# Patient Record
Sex: MALE | Race: WHITE | NOT HISPANIC OR LATINO
[De-identification: names, ages, dates, MRNs, and addresses within clinical notes are randomized per-mention and may not be internally consistent; named-entity substitution may affect disease eponyms.]

---

## 2018-11-15 PROBLEM — Z00.00 ENCOUNTER FOR PREVENTIVE HEALTH EXAMINATION: Status: ACTIVE | Noted: 2018-11-15

## 2019-01-23 ENCOUNTER — RECORD ABSTRACTING (OUTPATIENT)
Age: 74
End: 2019-01-23

## 2019-01-23 DIAGNOSIS — Z87.39 PERSONAL HISTORY OF OTHER DISEASES OF THE MUSCULOSKELETAL SYSTEM AND CONNECTIVE TISSUE: ICD-10-CM

## 2019-01-23 DIAGNOSIS — Z86.39 PERSONAL HISTORY OF OTHER ENDOCRINE, NUTRITIONAL AND METABOLIC DISEASE: ICD-10-CM

## 2019-01-23 DIAGNOSIS — Z86.79 PERSONAL HISTORY OF OTHER DISEASES OF THE CIRCULATORY SYSTEM: ICD-10-CM

## 2019-01-23 DIAGNOSIS — Z86.19 PERSONAL HISTORY OF OTHER INFECTIOUS AND PARASITIC DISEASES: ICD-10-CM

## 2019-01-23 RX ORDER — SIMVASTATIN 40 MG/1
40 TABLET, FILM COATED ORAL
Refills: 0 | Status: ACTIVE | COMMUNITY

## 2019-01-23 RX ORDER — PERPHENAZINE/AMITRIPTYLINE HCL 2 MG-25 MG
3000 TABLET ORAL
Refills: 0 | Status: ACTIVE | COMMUNITY

## 2019-01-23 RX ORDER — OMEPRAZOLE 20 MG/1
20 CAPSULE, DELAYED RELEASE ORAL
Refills: 0 | Status: ACTIVE | COMMUNITY

## 2019-01-23 RX ORDER — HYDROCHLOROTHIAZIDE 25 MG/1
25 TABLET ORAL
Refills: 0 | Status: ACTIVE | COMMUNITY

## 2019-01-23 RX ORDER — METOPROLOL TARTRATE 50 MG/1
50 TABLET, FILM COATED ORAL
Refills: 0 | Status: ACTIVE | COMMUNITY

## 2019-01-23 RX ORDER — IRBESARTAN 300 MG/1
300 TABLET ORAL
Refills: 0 | Status: ACTIVE | COMMUNITY

## 2019-01-23 RX ORDER — OMEGA-3/DHA/EPA/FISH OIL 1200 MG
1200 CAPSULE ORAL
Refills: 0 | Status: ACTIVE | COMMUNITY

## 2019-01-28 ENCOUNTER — APPOINTMENT (OUTPATIENT)
Dept: RHEUMATOLOGY | Facility: CLINIC | Age: 74
End: 2019-01-28
Payer: MEDICARE

## 2019-01-28 VITALS
BODY MASS INDEX: 31.56 KG/M2 | DIASTOLIC BLOOD PRESSURE: 70 MMHG | WEIGHT: 233 LBS | SYSTOLIC BLOOD PRESSURE: 120 MMHG | HEIGHT: 72 IN

## 2019-01-28 DIAGNOSIS — Z78.9 OTHER SPECIFIED HEALTH STATUS: ICD-10-CM

## 2019-01-28 DIAGNOSIS — Z80.1 FAMILY HISTORY OF MALIGNANT NEOPLASM OF TRACHEA, BRONCHUS AND LUNG: ICD-10-CM

## 2019-01-28 DIAGNOSIS — Z87.891 PERSONAL HISTORY OF NICOTINE DEPENDENCE: ICD-10-CM

## 2019-01-28 PROCEDURE — 99213 OFFICE O/P EST LOW 20 MIN: CPT

## 2019-01-30 PROBLEM — Z78.9 SOCIAL ALCOHOL USE: Status: ACTIVE | Noted: 2019-01-30

## 2019-01-30 PROBLEM — Z80.1 FAMILY HISTORY OF LUNG CANCER: Status: ACTIVE | Noted: 2019-01-30

## 2019-01-30 PROBLEM — Z87.891 FORMER SMOKER: Status: ACTIVE | Noted: 2019-01-30

## 2019-01-30 NOTE — HISTORY OF PRESENT ILLNESS
[FreeTextEntry1] :  72 yo male with RA here for f/u. He is currently taking MTX 6 tabs weekly + folic acid, Remicade infusions every 8 weeks, Plaquenil 200 mg BID and Celebrex 200 mg BID. Last infusion was 1/14/19.\par He has increased pain in his hands and left ankle with the cold weather.  He uses Tylenol Arthritis PRN, which helps, but he uses it rarely, mostly when he can't sleep.  No skin rashes or psoriasis.  His hands are very stiff lately.

## 2019-01-31 ENCOUNTER — RX RENEWAL (OUTPATIENT)
Age: 74
End: 2019-01-31

## 2019-05-01 ENCOUNTER — RX RENEWAL (OUTPATIENT)
Age: 74
End: 2019-05-01

## 2019-06-24 ENCOUNTER — APPOINTMENT (OUTPATIENT)
Dept: RHEUMATOLOGY | Facility: CLINIC | Age: 74
End: 2019-06-24
Payer: MEDICARE

## 2019-06-24 VITALS
WEIGHT: 232 LBS | HEIGHT: 72 IN | BODY MASS INDEX: 31.42 KG/M2 | SYSTOLIC BLOOD PRESSURE: 118 MMHG | DIASTOLIC BLOOD PRESSURE: 72 MMHG

## 2019-06-24 PROCEDURE — 99213 OFFICE O/P EST LOW 20 MIN: CPT

## 2019-06-24 RX ORDER — AZITHROMYCIN 250 MG/1
250 TABLET, FILM COATED ORAL
Qty: 1 | Refills: 0 | Status: DISCONTINUED | COMMUNITY
Start: 2019-01-28 | End: 2019-06-24

## 2019-06-24 NOTE — HISTORY OF PRESENT ILLNESS
[FreeTextEntry1] : 74 yo male with RA here for f/u. He is currently taking MTX 6 tabs weekly + folic acid, Remicade infusions every 8 weeks, Plaquenil 200 mg BID and Celebrex 200 mg BID. Last infusion was 5/9/19.\par He had pain in his right big toe.  Saw ortho and had an xray but declines bunion surgery.  He gets pain in his hands and wrists with the rainy weather.  Last week he had left wrist pain, burning in quality, but it has since resolved.\par His PMD referred him to a urologist bc he had blood in his urine.\par He takes Tylenol Arthritis when he is really suffering.\par \par

## 2019-07-08 ENCOUNTER — RESULT REVIEW (OUTPATIENT)
Age: 74
End: 2019-07-08

## 2019-08-20 ENCOUNTER — APPOINTMENT (OUTPATIENT)
Dept: RHEUMATOLOGY | Facility: CLINIC | Age: 74
End: 2019-08-20
Payer: MEDICARE

## 2019-08-20 VITALS
DIASTOLIC BLOOD PRESSURE: 74 MMHG | HEIGHT: 74 IN | WEIGHT: 232 LBS | SYSTOLIC BLOOD PRESSURE: 110 MMHG | BODY MASS INDEX: 29.77 KG/M2

## 2019-08-20 PROCEDURE — 99213 OFFICE O/P EST LOW 20 MIN: CPT

## 2019-08-20 NOTE — HISTORY OF PRESENT ILLNESS
[FreeTextEntry1] : 72 yo male with RA here for f/u. He is currently taking MTX 6 tabs weekly + folic acid, Remicade infusions every 8 weeks, Plaquenil 200 mg BID and Celebrex 200 mg BID. Last infusion was 7/8/19.  He c/o pain at the base of his thumbs, sometimes radiating into his forearms.  + stiffness in his joints that lasts all day\par \par

## 2019-09-03 ENCOUNTER — RESULT REVIEW (OUTPATIENT)
Age: 74
End: 2019-09-03

## 2019-10-29 ENCOUNTER — RESULT REVIEW (OUTPATIENT)
Age: 74
End: 2019-10-29

## 2019-11-11 ENCOUNTER — APPOINTMENT (OUTPATIENT)
Dept: RHEUMATOLOGY | Facility: CLINIC | Age: 74
End: 2019-11-11
Payer: MEDICARE

## 2019-11-11 VITALS
HEIGHT: 74 IN | BODY MASS INDEX: 29.77 KG/M2 | SYSTOLIC BLOOD PRESSURE: 124 MMHG | WEIGHT: 232 LBS | DIASTOLIC BLOOD PRESSURE: 80 MMHG

## 2019-11-11 PROCEDURE — 99213 OFFICE O/P EST LOW 20 MIN: CPT

## 2019-11-11 NOTE — HISTORY OF PRESENT ILLNESS
[FreeTextEntry1] : 75 yo male with RA here for f/u. He is currently taking MTX 6 tabs weekly + folic acid, Remicade infusions every 8 weeks, Plaquenil 200 mg BID and Celebrex 200 mg BID. Last infusion was 10/29/19.  \par He saw his cardiologist, who wants a stress test next year.\par He c/o pain at the base of his thumbs, sometimes radiating into his forearms.  \par + stiffness in his joints that lasts all day\par Seeing a urologist for a small amount of blood in his urine.

## 2019-12-27 ENCOUNTER — RESULT REVIEW (OUTPATIENT)
Age: 74
End: 2019-12-27

## 2020-03-03 ENCOUNTER — RESULT REVIEW (OUTPATIENT)
Age: 75
End: 2020-03-03

## 2020-04-08 ENCOUNTER — APPOINTMENT (OUTPATIENT)
Dept: RHEUMATOLOGY | Facility: CLINIC | Age: 75
End: 2020-04-08
Payer: MEDICARE

## 2020-04-08 PROCEDURE — 99442: CPT

## 2020-05-18 ENCOUNTER — APPOINTMENT (OUTPATIENT)
Dept: RHEUMATOLOGY | Facility: CLINIC | Age: 75
End: 2020-05-18
Payer: MEDICARE

## 2020-05-18 PROCEDURE — 99213 OFFICE O/P EST LOW 20 MIN: CPT | Mod: 95

## 2020-05-18 NOTE — HISTORY OF PRESENT ILLNESS
[Home] : at home, [unfilled] , at the time of the visit. [Medical Office: ([facility])___] : at the medical office located in  [Patient] : the patient [Self] : self [FreeTextEntry2] : David [FreeTextEntry1] : He is currently taking MTX 6 tabs weekly + folic acid, Remicade infusions every 8 weeks, Plaquenil 200 mg BID and Celebrex 200 mg BID. Last infusion was 3/3/20.  He is 2 weeks late for his infusion.\par + stiffness in his joints that lasts all day\par

## 2020-05-18 NOTE — REASON FOR VISIT
[Home] : at home, [unfilled] , at the time of the visit. [Medical Office: ([facility])___] : at the medical office located in  [Self] : self [Patient] : the patient [FreeTextEntry2] : David [Follow-Up: _____] : a [unfilled] follow-up visit

## 2020-06-01 ENCOUNTER — RESULT REVIEW (OUTPATIENT)
Age: 75
End: 2020-06-01

## 2020-06-08 ENCOUNTER — APPOINTMENT (OUTPATIENT)
Dept: RHEUMATOLOGY | Facility: CLINIC | Age: 75
End: 2020-06-08
Payer: MEDICARE

## 2020-06-08 DIAGNOSIS — D64.9 ANEMIA, UNSPECIFIED: ICD-10-CM

## 2020-06-08 PROCEDURE — 99442: CPT | Mod: 95

## 2020-06-08 NOTE — HISTORY OF PRESENT ILLNESS
[Home] : at home, [unfilled] , at the time of the visit. [Other Location: e.g. Home (Enter Location, City,State)___] : at [unfilled] [Verbal consent obtained from patient] : the patient, [unfilled] [FreeTextEntry1] : He is currently taking MTX 6 tabs weekly + folic acid, Remicade infusions every 8 weeks, Plaquenil 200 mg BID and Celebrex 200 mg BID. Last infusion was 6/1/20.  \par He was a few weeks late for the infusion and noticed increased achiness and his skin started to flare.  Once he received the infusion, he felt much better.

## 2020-07-24 ENCOUNTER — RESULT REVIEW (OUTPATIENT)
Age: 75
End: 2020-07-24

## 2020-07-27 ENCOUNTER — RESULT REVIEW (OUTPATIENT)
Age: 75
End: 2020-07-27

## 2020-08-10 ENCOUNTER — APPOINTMENT (OUTPATIENT)
Dept: RHEUMATOLOGY | Facility: CLINIC | Age: 75
End: 2020-08-10
Payer: MEDICARE

## 2020-08-10 VITALS
BODY MASS INDEX: 29.77 KG/M2 | DIASTOLIC BLOOD PRESSURE: 60 MMHG | SYSTOLIC BLOOD PRESSURE: 110 MMHG | HEIGHT: 74 IN | WEIGHT: 232 LBS

## 2020-08-10 PROCEDURE — 99214 OFFICE O/P EST MOD 30 MIN: CPT

## 2020-08-13 NOTE — HISTORY OF PRESENT ILLNESS
[FreeTextEntry1] : He is currently taking MTX 6 tabs weekly + folic acid, Remicade infusions every 8 weeks, Plaquenil 200 mg BID and Celebrex 200 mg BID. Last infusion was 7/27/20.  \par he has achiness and stiffness in his hands, no more than usual.  He never started using the Voltaren Gel.  His ankle is constantly in pain due to previous fracture but the oonly way to settle that is with surgical fusion.

## 2020-09-21 ENCOUNTER — RESULT REVIEW (OUTPATIENT)
Age: 75
End: 2020-09-21

## 2020-10-05 ENCOUNTER — APPOINTMENT (OUTPATIENT)
Dept: RHEUMATOLOGY | Facility: CLINIC | Age: 75
End: 2020-10-05
Payer: MEDICARE

## 2020-10-05 PROCEDURE — 99213 OFFICE O/P EST LOW 20 MIN: CPT

## 2020-10-05 NOTE — HISTORY OF PRESENT ILLNESS
[FreeTextEntry1] : He is currently taking MTX 6 tabs weekly + folic acid, Remicade infusions every 8 weeks, Plaquenil 200 mg BID and Celebrex 200 mg BID. Last infusion was 9/21/20.\par \par He overdid it yesterday and so is sore.\par He applies Voltaren gel about twice a day.

## 2020-11-16 ENCOUNTER — RESULT REVIEW (OUTPATIENT)
Age: 75
End: 2020-11-16

## 2020-12-07 ENCOUNTER — APPOINTMENT (OUTPATIENT)
Dept: RHEUMATOLOGY | Facility: CLINIC | Age: 75
End: 2020-12-07
Payer: MEDICARE

## 2020-12-07 VITALS
HEIGHT: 74 IN | WEIGHT: 232 LBS | SYSTOLIC BLOOD PRESSURE: 130 MMHG | BODY MASS INDEX: 29.77 KG/M2 | DIASTOLIC BLOOD PRESSURE: 80 MMHG

## 2020-12-07 PROCEDURE — 99213 OFFICE O/P EST LOW 20 MIN: CPT

## 2020-12-07 NOTE — HISTORY OF PRESENT ILLNESS
[FreeTextEntry1] : He is currently taking MTX 6 tabs weekly + folic acid, Remicade infusions every 8 weeks, Plaquenil 200 mg BID and Celebrex 200 mg BID. Last infusion was 11/16/20.\par He gets pain at the base of his right thumb.  He only really notices it if he doesn't apply the Voltaren gel.

## 2021-01-08 ENCOUNTER — RESULT REVIEW (OUTPATIENT)
Age: 76
End: 2021-01-08

## 2021-01-11 ENCOUNTER — RESULT REVIEW (OUTPATIENT)
Age: 76
End: 2021-01-11

## 2021-02-01 ENCOUNTER — APPOINTMENT (OUTPATIENT)
Dept: RHEUMATOLOGY | Facility: CLINIC | Age: 76
End: 2021-02-01
Payer: MEDICARE

## 2021-02-01 PROCEDURE — 99213 OFFICE O/P EST LOW 20 MIN: CPT | Mod: 95

## 2021-02-01 NOTE — HISTORY OF PRESENT ILLNESS
[Home] : at home, [unfilled] , at the time of the visit. [Other Location: e.g. Home (Enter Location, City,State)___] : at [unfilled] [Verbal consent obtained from patient] : the patient, [unfilled] [FreeTextEntry1] : He is currently taking MTX 6 tabs weekly + folic acid, Remicade infusions every 8 weeks, Plaquenil 200 mg BID and Celebrex 200 mg BID. Last infusion was 1/12/21.\par When he wakes up in the morning he feels congested and it resolves throughout the day.  Every once in awhile he checks his temperature, and it is normal.  He has a history of allergies but doesn't take anti-histamines.\par He gets pain at the base of his right thumb.  He only really notices it if he doesn't apply the Voltaren gel.

## 2021-03-05 ENCOUNTER — RESULT REVIEW (OUTPATIENT)
Age: 76
End: 2021-03-05

## 2021-03-08 ENCOUNTER — RESULT REVIEW (OUTPATIENT)
Age: 76
End: 2021-03-08

## 2021-03-25 ENCOUNTER — APPOINTMENT (OUTPATIENT)
Dept: RHEUMATOLOGY | Facility: CLINIC | Age: 76
End: 2021-03-25
Payer: MEDICARE

## 2021-03-25 PROCEDURE — 99213 OFFICE O/P EST LOW 20 MIN: CPT | Mod: 95

## 2021-03-28 NOTE — HISTORY OF PRESENT ILLNESS
[Home] : at home, [unfilled] , at the time of the visit. [Medical Office: ([facility])___] : at the medical office located in  [Verbal consent obtained from patient] : the patient, [unfilled] [FreeTextEntry1] : He is currently taking MTX 6 tabs weekly + folic acid, Remicade infusions every 8 weeks, Plaquenil 200 mg BID and Celebrex 200 mg BID. Last infusion was 3/8/21.\par \par He received both Moderna vaccines.\par \par His calf swelled up and his foot became discolored.  His PMD ordered an US which didn’t show any DVT but a Baker's cyst.  It is improving slowly.  It still feels tight.  The discoloration on his foot went away.

## 2021-04-30 ENCOUNTER — RESULT REVIEW (OUTPATIENT)
Age: 76
End: 2021-04-30

## 2021-05-03 ENCOUNTER — RESULT REVIEW (OUTPATIENT)
Age: 76
End: 2021-05-03

## 2021-05-13 ENCOUNTER — APPOINTMENT (OUTPATIENT)
Dept: RHEUMATOLOGY | Facility: CLINIC | Age: 76
End: 2021-05-13
Payer: MEDICARE

## 2021-05-13 ENCOUNTER — NON-APPOINTMENT (OUTPATIENT)
Age: 76
End: 2021-05-13

## 2021-05-13 VITALS
TEMPERATURE: 98.9 F | BODY MASS INDEX: 28.75 KG/M2 | SYSTOLIC BLOOD PRESSURE: 118 MMHG | WEIGHT: 224 LBS | HEIGHT: 74 IN | DIASTOLIC BLOOD PRESSURE: 68 MMHG

## 2021-05-13 PROCEDURE — 99213 OFFICE O/P EST LOW 20 MIN: CPT

## 2021-05-18 NOTE — HISTORY OF PRESENT ILLNESS
[FreeTextEntry1] : He is currently taking MTX 6 tabs weekly + folic acid, Remicade infusions every 8 weeks, Plaquenil 200 mg BID and Celebrex 200 mg BID. Last infusion was 5/3/21.\par \par He received both Moderna vaccines.\par \par He had 2 teeth pulled.\par \par Chavis's cyst improving.

## 2021-07-16 ENCOUNTER — RESULT REVIEW (OUTPATIENT)
Age: 76
End: 2021-07-16

## 2021-07-26 ENCOUNTER — APPOINTMENT (OUTPATIENT)
Dept: RHEUMATOLOGY | Facility: CLINIC | Age: 76
End: 2021-07-26
Payer: MEDICARE

## 2021-07-26 VITALS
RESPIRATION RATE: 16 BRPM | TEMPERATURE: 98 F | OXYGEN SATURATION: 98 % | DIASTOLIC BLOOD PRESSURE: 70 MMHG | HEART RATE: 65 BPM | SYSTOLIC BLOOD PRESSURE: 128 MMHG | BODY MASS INDEX: 29.39 KG/M2 | HEIGHT: 74 IN | WEIGHT: 229 LBS

## 2021-07-26 PROCEDURE — 99213 OFFICE O/P EST LOW 20 MIN: CPT

## 2021-07-28 NOTE — HISTORY OF PRESENT ILLNESS
[FreeTextEntry1] : He is currently taking MTX 6 tabs weekly + folic acid, Remicade infusions every 8 weeks, Plaquenil 200 mg BID and Celebrex 200 mg BID. Last infusion was 7/16/21.\par \par

## 2021-09-13 ENCOUNTER — RESULT REVIEW (OUTPATIENT)
Age: 76
End: 2021-09-13

## 2021-11-02 ENCOUNTER — APPOINTMENT (OUTPATIENT)
Dept: RHEUMATOLOGY | Facility: CLINIC | Age: 76
End: 2021-11-02
Payer: MEDICARE

## 2021-11-02 PROCEDURE — 99213 OFFICE O/P EST LOW 20 MIN: CPT

## 2021-11-05 NOTE — HISTORY OF PRESENT ILLNESS
[FreeTextEntry1] : He is currently taking MTX 6 tabs weekly + folic acid, Remicade infusions every 8 weeks, Plaquenil 200 mg BID and Celebrex 200 mg BID. Last infusion was 9/13/21.\par \par He received second Moderna vaccine in March.\par \par He stopped using voltaren and he notes some worsening of his symptoms.  previously he was using it twice a day.\par

## 2021-11-08 ENCOUNTER — RESULT REVIEW (OUTPATIENT)
Age: 76
End: 2021-11-08

## 2022-01-16 ENCOUNTER — RESULT REVIEW (OUTPATIENT)
Age: 77
End: 2022-01-16

## 2022-01-19 ENCOUNTER — RESULT REVIEW (OUTPATIENT)
Age: 77
End: 2022-01-19

## 2022-02-03 ENCOUNTER — APPOINTMENT (OUTPATIENT)
Dept: RHEUMATOLOGY | Facility: CLINIC | Age: 77
End: 2022-02-03
Payer: MEDICARE

## 2022-02-03 PROCEDURE — 99212 OFFICE O/P EST SF 10 MIN: CPT | Mod: 95

## 2022-02-07 NOTE — ASSESSMENT
[FreeTextEntry1] : Booster COVID vaccine recommended due to immunosuppressed status.  Advised to hold MTX for 1 week after receiving the vaccine.\par

## 2022-02-07 NOTE — HISTORY OF PRESENT ILLNESS
[FreeTextEntry1] : He is currently taking MTX 6 tabs weekly + folic acid, Remicade infusions every 8 weeks, Plaquenil 200 mg BID and Celebrex 200 mg BID. Last infusion was 1/19//22.\par \par No new complaints.  He usually gets achy and stiff in the cold weather.\par \par He received his second COVID Moderna vaccine in March.  He has not received the booster yet.\par \par On 12/26 he developed a fever.  He called his PMD, who prescribed a Z-pack.  His symptoms resolved within 2 days.

## 2022-02-07 NOTE — REASON FOR VISIT
[Home] : at home, [unfilled] , at the time of the visit. [Medical Office: ([facility])___] : at the medical office located in  [Verbal consent obtained from patient] : the patient, [unfilled]

## 2022-03-16 ENCOUNTER — RESULT REVIEW (OUTPATIENT)
Age: 77
End: 2022-03-16

## 2022-04-04 ENCOUNTER — APPOINTMENT (OUTPATIENT)
Dept: RHEUMATOLOGY | Facility: CLINIC | Age: 77
End: 2022-04-04
Payer: MEDICARE

## 2022-04-04 VITALS
HEART RATE: 68 BPM | DIASTOLIC BLOOD PRESSURE: 68 MMHG | BODY MASS INDEX: 29.39 KG/M2 | WEIGHT: 229 LBS | OXYGEN SATURATION: 97 % | HEIGHT: 74 IN | SYSTOLIC BLOOD PRESSURE: 122 MMHG

## 2022-04-04 PROCEDURE — 99213 OFFICE O/P EST LOW 20 MIN: CPT

## 2022-05-11 ENCOUNTER — RESULT REVIEW (OUTPATIENT)
Age: 77
End: 2022-05-11

## 2022-06-03 ENCOUNTER — APPOINTMENT (OUTPATIENT)
Dept: RHEUMATOLOGY | Facility: CLINIC | Age: 77
End: 2022-06-03
Payer: MEDICARE

## 2022-06-03 VITALS
BODY MASS INDEX: 29.39 KG/M2 | WEIGHT: 229 LBS | SYSTOLIC BLOOD PRESSURE: 124 MMHG | HEIGHT: 74 IN | HEART RATE: 67 BPM | DIASTOLIC BLOOD PRESSURE: 64 MMHG | OXYGEN SATURATION: 96 %

## 2022-06-03 PROCEDURE — 99213 OFFICE O/P EST LOW 20 MIN: CPT

## 2022-06-10 NOTE — HISTORY OF PRESENT ILLNESS
[FreeTextEntry1] : He is currently taking MTX 6 tabs weekly + folic acid, Remicade infusions every 8 weeks, Plaquenil 200 mg BID and Celebrex 200 mg BID. Last infusion was 5/11//22.\par \par No new complaints.  \par \par Third COVID vaccine was in December.\par

## 2022-07-06 ENCOUNTER — RESULT REVIEW (OUTPATIENT)
Age: 77
End: 2022-07-06

## 2022-08-10 ENCOUNTER — APPOINTMENT (OUTPATIENT)
Dept: RHEUMATOLOGY | Facility: CLINIC | Age: 77
End: 2022-08-10

## 2022-08-10 VITALS
HEIGHT: 74 IN | WEIGHT: 229 LBS | OXYGEN SATURATION: 97 % | HEART RATE: 68 BPM | BODY MASS INDEX: 29.39 KG/M2 | DIASTOLIC BLOOD PRESSURE: 80 MMHG | SYSTOLIC BLOOD PRESSURE: 116 MMHG

## 2022-08-10 PROCEDURE — 99213 OFFICE O/P EST LOW 20 MIN: CPT

## 2022-08-17 NOTE — HISTORY OF PRESENT ILLNESS
[FreeTextEntry1] : He is currently taking MTX 6 tabs weekly + folic acid, Remicade infusions every 8 weeks, Plaquenil 200 mg BID and Celebrex 200 mg BID. Last infusion was 5/11//22.\par \par No new complaints.  \par He reports achiness in his hands and feet, worse in the morning.  + morning stiffness\par not taking any OTC meds for the pain.\par \par 6/21/22 was 4th COVID vaccine.

## 2022-08-31 ENCOUNTER — RESULT REVIEW (OUTPATIENT)
Age: 77
End: 2022-08-31

## 2022-10-26 ENCOUNTER — RESULT REVIEW (OUTPATIENT)
Age: 77
End: 2022-10-26

## 2023-01-30 ENCOUNTER — RESULT REVIEW (OUTPATIENT)
Age: 78
End: 2023-01-30

## 2023-03-27 ENCOUNTER — RESULT REVIEW (OUTPATIENT)
Age: 78
End: 2023-03-27

## 2023-03-27 ENCOUNTER — APPOINTMENT (OUTPATIENT)
Dept: RHEUMATOLOGY | Facility: CLINIC | Age: 78
End: 2023-03-27
Payer: MEDICARE

## 2023-03-27 VITALS
SYSTOLIC BLOOD PRESSURE: 142 MMHG | DIASTOLIC BLOOD PRESSURE: 82 MMHG | BODY MASS INDEX: 29.39 KG/M2 | WEIGHT: 229 LBS | OXYGEN SATURATION: 99 % | HEART RATE: 71 BPM | HEIGHT: 74 IN

## 2023-03-27 PROCEDURE — 99214 OFFICE O/P EST MOD 30 MIN: CPT

## 2023-03-29 NOTE — HISTORY OF PRESENT ILLNESS
[FreeTextEntry1] : He is currently taking MTX 6 tabs weekly + folic acid, Remicade infusions every 8 weeks, Plaquenil 200 mg BID and Celebrex 200 mg BID. Last infusion was 3/27/23.\par \par He missed one infusions bc he had COVID.

## 2023-05-22 ENCOUNTER — RESULT REVIEW (OUTPATIENT)
Age: 78
End: 2023-05-22

## 2023-05-22 ENCOUNTER — APPOINTMENT (OUTPATIENT)
Dept: RHEUMATOLOGY | Facility: CLINIC | Age: 78
End: 2023-05-22
Payer: MEDICARE

## 2023-05-22 PROCEDURE — 99441: CPT | Mod: 95

## 2023-05-22 RX ORDER — CELECOXIB 200 MG/1
200 CAPSULE ORAL
Refills: 0 | Status: ACTIVE | COMMUNITY

## 2023-05-22 NOTE — HISTORY OF PRESENT ILLNESS
[Home] : at home, [unfilled] , at the time of the visit. [Medical Office: ([facility])___] : at the medical office located in  [Verbal consent obtained from patient] : the patient, [unfilled] [FreeTextEntry1] : He is currently taking MTX 6 tabs weekly + folic acid, Remicade infusions every 8 weeks, Plaquenil 200 mg BID and Celebrex 200 mg BID. Last infusion was earlier today.\par \par He noticed more joint pain this week in his hands and feet.  No joint swelling.  + morning stiffness, which improves a little with moving around.

## 2023-07-17 ENCOUNTER — RESULT REVIEW (OUTPATIENT)
Age: 78
End: 2023-07-17

## 2023-07-26 ENCOUNTER — APPOINTMENT (OUTPATIENT)
Dept: RHEUMATOLOGY | Facility: CLINIC | Age: 78
End: 2023-07-26
Payer: MEDICARE

## 2023-07-26 VITALS
WEIGHT: 232 LBS | HEART RATE: 55 BPM | DIASTOLIC BLOOD PRESSURE: 80 MMHG | SYSTOLIC BLOOD PRESSURE: 130 MMHG | OXYGEN SATURATION: 97 % | BODY MASS INDEX: 29.79 KG/M2

## 2023-07-26 PROCEDURE — 99214 OFFICE O/P EST MOD 30 MIN: CPT

## 2023-07-26 NOTE — HISTORY OF PRESENT ILLNESS
[FreeTextEntry1] : He is currently taking MTX 6 tabs weekly + folic acid, Remicade infusions every 8 weeks, Plaquenil 200 mg BID and Celebrex 200 mg BID. Last infusion was 7/17/23.\par \par In 2001 he had right knee medial meniscus and ACL repair.  Recently his right knee has been bothering him.  He changed a tire in January and since then his right knee hurts.  One day his right leg/foot because swollen and bruised.  He got an ultrasound and was told it was a ruptured Bakers cyst.

## 2023-07-26 NOTE — ASSESSMENT
[FreeTextEntry1] : The patient's current disease and medications (MTX, HCQ, Remicade) necessitate close follow up to assess for progression of the disease and laboratory testing to assess for drug toxicity and response to treatment. Failure to follow up in a timely manner can result in laboratory abnormalities, poorly controlled disease, medication side effects or infectious complications.\par \par

## 2023-09-11 ENCOUNTER — RESULT REVIEW (OUTPATIENT)
Age: 78
End: 2023-09-11

## 2023-09-12 ENCOUNTER — RESULT REVIEW (OUTPATIENT)
Age: 78
End: 2023-09-12

## 2023-10-11 ENCOUNTER — APPOINTMENT (OUTPATIENT)
Dept: RHEUMATOLOGY | Facility: CLINIC | Age: 78
End: 2023-10-11
Payer: MEDICARE

## 2023-10-11 VITALS
OXYGEN SATURATION: 97 % | SYSTOLIC BLOOD PRESSURE: 120 MMHG | HEIGHT: 72 IN | RESPIRATION RATE: 16 BRPM | DIASTOLIC BLOOD PRESSURE: 70 MMHG | BODY MASS INDEX: 31.42 KG/M2 | WEIGHT: 232 LBS | HEART RATE: 57 BPM

## 2023-10-11 PROCEDURE — 99214 OFFICE O/P EST MOD 30 MIN: CPT

## 2023-11-06 ENCOUNTER — RESULT REVIEW (OUTPATIENT)
Age: 78
End: 2023-11-06

## 2023-12-14 ENCOUNTER — APPOINTMENT (OUTPATIENT)
Dept: RHEUMATOLOGY | Facility: CLINIC | Age: 78
End: 2023-12-14
Payer: MEDICARE

## 2023-12-14 VITALS
DIASTOLIC BLOOD PRESSURE: 68 MMHG | HEART RATE: 55 BPM | OXYGEN SATURATION: 98 % | HEIGHT: 72 IN | WEIGHT: 230 LBS | BODY MASS INDEX: 31.15 KG/M2 | SYSTOLIC BLOOD PRESSURE: 120 MMHG

## 2023-12-14 DIAGNOSIS — R31.29 OTHER MICROSCOPIC HEMATURIA: ICD-10-CM

## 2023-12-14 DIAGNOSIS — E55.9 VITAMIN D DEFICIENCY, UNSPECIFIED: ICD-10-CM

## 2023-12-14 DIAGNOSIS — M17.11 UNILATERAL PRIMARY OSTEOARTHRITIS, RIGHT KNEE: ICD-10-CM

## 2023-12-14 DIAGNOSIS — M06.9 RHEUMATOID ARTHRITIS, UNSPECIFIED: ICD-10-CM

## 2023-12-14 DIAGNOSIS — M19.242 SECONDARY OSTEOARTHRITIS, LEFT HAND: ICD-10-CM

## 2023-12-14 DIAGNOSIS — D72.819 DECREASED WHITE BLOOD CELL COUNT, UNSPECIFIED: ICD-10-CM

## 2023-12-14 PROCEDURE — 99214 OFFICE O/P EST MOD 30 MIN: CPT

## 2023-12-30 PROBLEM — M17.11 LOCALIZED OSTEOARTHRITIS OF RIGHT KNEE: Status: ACTIVE | Noted: 2020-08-13

## 2023-12-30 PROBLEM — M06.9 RHEUMATOID ARTHRITIS: Status: ACTIVE | Noted: 2019-01-23

## 2023-12-30 PROBLEM — D72.819 LEUKOPENIA: Status: ACTIVE | Noted: 2019-01-23

## 2023-12-30 PROBLEM — M19.242 OTHER SECONDARY OSTEOARTHRITIS OF LEFT HAND: Status: ACTIVE | Noted: 2019-01-30

## 2023-12-30 PROBLEM — E55.9 VITAMIN D DEFICIENCY: Status: ACTIVE | Noted: 2019-01-30

## 2023-12-30 PROBLEM — R31.29 MICROSCOPIC HEMATURIA: Status: ACTIVE | Noted: 2020-04-10

## 2023-12-30 RX ORDER — FOLIC ACID 1 MG/1
1 TABLET ORAL
Refills: 0 | Status: ACTIVE | COMMUNITY

## 2023-12-30 RX ORDER — DICLOFENAC SODIUM 10 MG/G
1 GEL TOPICAL
Qty: 3 | Refills: 3 | Status: ACTIVE | COMMUNITY
Start: 2019-08-20

## 2023-12-30 RX ORDER — HYDROXYCHLOROQUINE SULFATE 200 MG/1
200 TABLET, FILM COATED ORAL
Refills: 0 | Status: ACTIVE | COMMUNITY

## 2023-12-30 RX ORDER — METHOTREXATE 2.5 MG/1
2.5 TABLET ORAL
Qty: 78 | Refills: 1 | Status: ACTIVE | COMMUNITY

## 2023-12-30 RX ORDER — INFLIXIMAB 100 MG/10ML
100 INJECTION, POWDER, LYOPHILIZED, FOR SOLUTION INTRAVENOUS
Refills: 0 | Status: ACTIVE | COMMUNITY

## 2023-12-30 RX ORDER — CHROMIUM 200 MCG
1000 TABLET ORAL
Refills: 0 | Status: ACTIVE | COMMUNITY

## 2023-12-30 NOTE — HISTORY OF PRESENT ILLNESS
[FreeTextEntry1] : He is currently taking MTX 6 tabs weekly + folic acid, Remicade infusions every 8 weeks, Plaquenil 200 mg BID and Celebrex 200 mg BID. Last infusion was 11/6/23.  The base of his thumbs have been hurting.

## 2023-12-30 NOTE — ASSESSMENT
[FreeTextEntry1] : The patient's current disease and medications (MTX, Remicade, HCQ) necessitate close follow up to assess for progression of the disease and laboratory testing to assess for drug toxicity and response to treatment. Failure to follow up in a timely manner can result in laboratory abnormalities, poorly controlled disease, medication side effects or infectious complications.